# Patient Record
Sex: FEMALE | Race: WHITE
[De-identification: names, ages, dates, MRNs, and addresses within clinical notes are randomized per-mention and may not be internally consistent; named-entity substitution may affect disease eponyms.]

---

## 2019-01-17 ENCOUNTER — HOSPITAL ENCOUNTER (OUTPATIENT)
Dept: HOSPITAL 89 - LAB | Age: 29
End: 2019-01-17
Attending: OBSTETRICS & GYNECOLOGY
Payer: COMMERCIAL

## 2019-01-17 DIAGNOSIS — Z02.9: Primary | ICD-10-CM

## 2019-01-18 ENCOUNTER — HOSPITAL ENCOUNTER (OUTPATIENT)
Dept: HOSPITAL 89 - US | Age: 29
End: 2019-01-18
Attending: PHYSICIAN ASSISTANT
Payer: COMMERCIAL

## 2019-01-18 ENCOUNTER — HOSPITAL ENCOUNTER (OUTPATIENT)
Dept: HOSPITAL 89 - RAD | Age: 29
End: 2019-01-18
Attending: OBSTETRICS & GYNECOLOGY
Payer: COMMERCIAL

## 2019-01-18 DIAGNOSIS — Z30.42: Primary | ICD-10-CM

## 2019-01-18 DIAGNOSIS — M85.88: ICD-10-CM

## 2019-01-18 DIAGNOSIS — R10.9: Primary | ICD-10-CM

## 2019-01-18 PROCEDURE — 87491 CHLMYD TRACH DNA AMP PROBE: CPT

## 2019-01-18 PROCEDURE — 76856 US EXAM PELVIC COMPLETE: CPT

## 2019-01-18 PROCEDURE — 77080 DXA BONE DENSITY AXIAL: CPT

## 2019-01-18 PROCEDURE — 87591 N.GONORRHOEAE DNA AMP PROB: CPT

## 2019-01-18 NOTE — RADIOLOGY IMAGING REPORT
FACILITY: [facility] [place] 

 

PATIENT NAME: Aris Lewis

: 1990

MR: [id number]

V: [id number]

EXAM DATE: 

ORDERING PHYSICIAN: JACLYN ESCOBAR

TECHNOLOGIST: 

 

Location: [facility]

Patient: Aris Lewis

: 1990

MRN: [id number]

Visit/Account:[id number]

Date of Sevice:  2019

 

ACCESSION #: [id number]

 

PELVIC

 

INDICATION: Pain,

 

COMPARISON: None Available

 

FINDINGS:

Uterus measures 4.9 x 2.6 x 4.1 cm and is homogeneous in echotexture.  No mass is identified.

 

Double wall endometrial stripe measures 2.1 mm and is homogeneous

 

There is no free fluid in the cul-de-sac.

 

Urinary bladder is empty.

Pelvic vessels appear unremarkable on this examination.

Right ovary measures 3.0 x 2.3 x 1.7 cm and shows normal blood flow and contains several small follic
les.

 

Left ovary measures 3.0 x 2.6 x 2.4 cm and shows normal blood flow and contains several small follicl
es.

 

 

 

IMPRESSION:

1.  Pelvic ultrasound is within normal limits

 

 

Report Dictated By: Alex Hong at 2019 5:47 PM

 

Report E-Signed By: Alex Hong  at 2019 5:50 PM

 

WSN:[id number]